# Patient Record
Sex: MALE | Race: WHITE | NOT HISPANIC OR LATINO | Employment: FULL TIME | URBAN - METROPOLITAN AREA
[De-identification: names, ages, dates, MRNs, and addresses within clinical notes are randomized per-mention and may not be internally consistent; named-entity substitution may affect disease eponyms.]

---

## 2023-05-09 ENCOUNTER — OFFICE VISIT (OUTPATIENT)
Dept: PRIMARY CARE | Facility: CLINIC | Age: 43
End: 2023-05-09
Payer: COMMERCIAL

## 2023-05-09 VITALS
TEMPERATURE: 98 F | BODY MASS INDEX: 24.77 KG/M2 | HEART RATE: 65 BPM | HEIGHT: 74 IN | RESPIRATION RATE: 16 BRPM | WEIGHT: 193 LBS | DIASTOLIC BLOOD PRESSURE: 76 MMHG | SYSTOLIC BLOOD PRESSURE: 122 MMHG | OXYGEN SATURATION: 99 %

## 2023-05-09 DIAGNOSIS — Z13.89 ENCOUNTER FOR SURVEILLANCE OF ABNORMAL NEVI: Primary | ICD-10-CM

## 2023-05-09 PROCEDURE — 1036F TOBACCO NON-USER: CPT | Performed by: FAMILY MEDICINE

## 2023-05-09 PROCEDURE — 99213 OFFICE O/P EST LOW 20 MIN: CPT | Performed by: FAMILY MEDICINE

## 2023-05-09 PROCEDURE — 17110 DESTRUCTION B9 LES UP TO 14: CPT | Performed by: FAMILY MEDICINE

## 2023-05-09 RX ORDER — EPINEPHRINE 0.3 MG/.3ML
0.3 INJECTION SUBCUTANEOUS AS NEEDED
COMMUNITY
Start: 2022-08-08

## 2023-05-09 ASSESSMENT — ENCOUNTER SYMPTOMS
MUSCULOSKELETAL NEGATIVE: 1
CONSTITUTIONAL NEGATIVE: 1
CARDIOVASCULAR NEGATIVE: 1
RESPIRATORY NEGATIVE: 1

## 2023-05-09 NOTE — PROGRESS NOTES
"Subjective   Patient ID: Yaakov Lance is a 43 y.o. male who presents for Follow-up (Mole on back).    HPI     Review of Systems   Constitutional: Negative.    Respiratory: Negative.     Cardiovascular: Negative.    Musculoskeletal: Negative.        Objective   /76 (BP Location: Right arm, Patient Position: Sitting, BP Cuff Size: Adult)   Pulse 65   Temp 36.7 °C (98 °F)   Resp 16   Ht 1.88 m (6' 2\")   Wt 87.5 kg (193 lb)   SpO2 99%   BMI 24.78 kg/m²     Physical Exam  Constitutional:       Appearance: Normal appearance.   Cardiovascular:      Rate and Rhythm: Normal rate and regular rhythm.   Pulmonary:      Effort: Pulmonary effort is normal.      Breath sounds: Normal breath sounds.   Skin:     Comments: Patient has a roughly three-quarter inch by three-quarter inch flesh-colored raised macule.   Neurological:      Mental Status: He is alert.         Assessment/Plan   Problem List Items Addressed This Visit    None  Visit Diagnoses       Encounter for surveillance of abnormal nevi    -  Primary    Relevant Orders    Referral to Dermatology    Cryotherapy, skin lesion        Cryo diarrhea x3.  Tolerated well with no complications.  He is aware of the risk complications from the procedure.  Also options of doing nothing, sending to Derm, doing a procedure here, he elected to have this done now.  We will see if this helps destroy the lesion if not he is can need a biopsy he is aware of this.  We will send him to dermatology  If any redness swelling any itching any worsening symptoms notify the office to go to the ER  Follow-up in 2 to 4 weeks       "

## 2023-07-09 LAB — URINE CULTURE: NORMAL

## 2023-07-10 LAB — URINE CULTURE: NO GROWTH

## 2023-09-19 PROBLEM — L50.3 DERMATOGRAPHIC URTICARIA: Status: ACTIVE | Noted: 2023-08-03

## 2025-03-07 ENCOUNTER — OFFICE VISIT (OUTPATIENT)
Dept: PRIMARY CARE | Facility: CLINIC | Age: 45
End: 2025-03-07
Payer: COMMERCIAL

## 2025-03-07 VITALS
BODY MASS INDEX: 24.77 KG/M2 | TEMPERATURE: 97.9 F | HEART RATE: 68 BPM | DIASTOLIC BLOOD PRESSURE: 80 MMHG | HEIGHT: 74 IN | RESPIRATION RATE: 16 BRPM | WEIGHT: 193 LBS | SYSTOLIC BLOOD PRESSURE: 128 MMHG

## 2025-03-07 DIAGNOSIS — Z71.89 CARDIAC RISK COUNSELING: ICD-10-CM

## 2025-03-07 DIAGNOSIS — Z00.00 ANNUAL PHYSICAL EXAM: Primary | ICD-10-CM

## 2025-03-07 DIAGNOSIS — Z91.030 BEE STING ALLERGY: ICD-10-CM

## 2025-03-07 DIAGNOSIS — M48.061 SPINAL STENOSIS OF LUMBAR REGION WITHOUT NEUROGENIC CLAUDICATION: ICD-10-CM

## 2025-03-07 DIAGNOSIS — Z71.85 VACCINE COUNSELING: ICD-10-CM

## 2025-03-07 PROCEDURE — 3008F BODY MASS INDEX DOCD: CPT

## 2025-03-07 PROCEDURE — 99396 PREV VISIT EST AGE 40-64: CPT

## 2025-03-07 PROCEDURE — 1036F TOBACCO NON-USER: CPT

## 2025-03-07 RX ORDER — EPINEPHRINE 0.3 MG/.3ML
1 INJECTION INTRAMUSCULAR AS NEEDED
Qty: 2 EACH | Refills: 3 | Status: SHIPPED | OUTPATIENT
Start: 2025-03-07

## 2025-03-07 NOTE — ASSESSMENT & PLAN NOTE
-Is questionable history of bee sting allergy.  He states the last time he was stung he had significant swelling at the site.  He does not seem to have ever had any anaphylactic symptoms.  Nonetheless, he has historically kept EpiPens out of abundance of caution  -Repeat EpiPens as ordered given he will be going on a long hike  Orders:    EPINEPHrine (EpiPen 2-Rashi) 0.3 mg/0.3 mL injection syringe; Inject 0.3 mL (0.3 mg) into the muscle if needed for anaphylaxis. Inject into upper leg. Call 911 after use.  May repeat dose x1 after 5-15 min

## 2025-03-07 NOTE — ASSESSMENT & PLAN NOTE
-He has a notable history of spinal stenosis which was previously worked up by my colleagues in our clinic in 2022.  He subsequently had an MRI which did show some diffuse degenerative disease of the lumbar spine as well as disc protrusion at the L5-S1 and L4-L5 regions.  He does not have a significant inciting injury and it is slightly unusual for him to have this degree of degenerative disc disease given his age.  He does not appear to have a significant family history of OA.  Given his significant service in the  including deployments in [place], it is possible that his degenerative disc disease could be at least in part or entirely due to extensive  service.  We discussed this at length  -He is otherwise stable and his symptoms have not worsened since that initial workup.  He does endorse that he occasionally gets a tingling sensation in the genital region which may be due to the compression of the S1 root

## 2025-03-07 NOTE — PROGRESS NOTES
Subjective   Yaakov Lance is a 44 y.o. male who presents for Annual Exam.  Here for annual Px. 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  He will be going on a hiking trip about 40 miles in [place].    Intermittent  blood with wiping, but only with wiping.  He states he has some intermittent anal pruritus as well.  He states he has never had any BRBPR.  He only wanted to discuss this because he wanted to assess whether or not I thought he had any type of issues with hemorrhoids.    States that when he has back pain he sometimes gets some radiating tingling/numbness sensation in his genital region.  He is wondering if this is due to his herniated disc compressing his nerve roots.          Comp Hx reviewed and documented appropriately in chart.     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 No pertinent past medical history.  Past Surgical History:   Procedure Laterality Date    OTHER SURGICAL HISTORY  10/18/2022    Vasectomy    US ASPIRATION INJECTION INTERMEDIATE JOINT  2/1/2022    US ASPIRATION INJECTION INTERMEDIATE JOINT 2/1/2022 [facility]       Social History  Social History     Social History Narrative    Not on file       Allergies and Medications  Bee venom protein (honey bee)  Current Outpatient Medications on File Prior to Visit   Medication Sig Dispense Refill    EPINEPHrine 0.3 mg/0.3 mL injection syringe Inject 0.3 mL (0.3 mg) as directed if needed for anaphylaxis. As Directed       No current facility-administered medications on file prior to visit.         /80   Pulse 68   Temp  "36.6 °C (97.9 °F)   Resp 16   Ht 1.88 m (6' 2\")   Wt 87.5 kg (193 lb)   BMI 24.78 kg/m²   Objective   Physical Exam  Constitutional:       General: He is not in acute distress.     Appearance: Normal appearance. He is not ill-appearing.   HENT:      Head: Normocephalic and atraumatic.      Right Ear: Tympanic membrane and ear canal normal. There is no impacted cerumen.      Left Ear: Tympanic membrane and ear canal normal. There is no impacted cerumen.      Mouth/Throat:      Mouth: Mucous membranes are moist.      Pharynx: Oropharynx is clear.   Eyes:      Extraocular Movements: Extraocular movements intact.      Pupils: Pupils are equal, round, and reactive to light.   Cardiovascular:      Rate and Rhythm: Normal rate and regular rhythm.      Pulses:           Radial pulses are 2+ on the right side and 2+ on the left side.        Posterior tibial pulses are 2+ on the right side and 2+ on the left side.      Heart sounds: Normal heart sounds.   Pulmonary:      Effort: Pulmonary effort is normal. No respiratory distress.      Breath sounds: Normal breath sounds.   Musculoskeletal:         General: Normal range of motion.      Cervical back: Normal range of motion and neck supple.      Right lower leg: No edema.      Left lower leg: No edema.   Lymphadenopathy:      Cervical: No cervical adenopathy.   Skin:     General: Skin is warm and dry.   Neurological:      General: No focal deficit present.      Mental Status: He is alert. Mental status is at baseline.   Psychiatric:         Mood and Affect: Mood normal.         Thought Content: Thought content normal.         Assessment & Plan  Annual physical exam  -Normal physical exam as above  -Comprehensive review of history documented in chart.  Notable history for MI in his father and paternal grandfather  -BP on manual recheck 128/80, acceptable       Vaccine counseling  -Discussed updating his Tdap vaccine.  He believes he has had it updated for work purposes with " [employer] and declined to have it repeated today       Bee sting allergy  -Is questionable history of bee sting allergy.  He states the last time he was stung he had significant swelling at the site.  He does not seem to have ever had any anaphylactic symptoms.  Nonetheless, he has historically kept EpiPens out of abundance of caution  -Repeat EpiPens as ordered given he will be going on a long hike  Orders:    EPINEPHrine (EpiPen 2-Rashi) 0.3 mg/0.3 mL injection syringe; Inject 0.3 mL (0.3 mg) into the muscle if needed for anaphylaxis. Inject into upper leg. Call 911 after use.  May repeat dose x1 after 5-15 min    Cardiac risk counseling  -Notable family history of MI.  His ASCVD score is only 1.4%.  He is age 44 and his a good candidate for CT calcium score test.  He is agreeable.  Ordered.  Orders:    CT cardiac scoring wo IV contrast; Future    Spinal stenosis of lumbar region without neurogenic claudication  -He has a notable history of spinal stenosis which was previously worked up by my colleagues in our clinic in 2022.  He subsequently had an MRI which did show some diffuse degenerative disease of the lumbar spine as well as disc protrusion at the L5-S1 and L4-L5 regions.  He does not have a significant inciting injury and it is slightly unusual for him to have this degree of degenerative disc disease given his age.  He does not appear to have a significant family history of OA.  Given his significant service in the  including deployments in [place], it is possible that his degenerative disc disease could be at least in part or entirely due to extensive  service.  We discussed this at length  -He is otherwise stable and his symptoms have not worsened since that initial workup.  He does endorse that he occasionally gets a tingling sensation in the genital region which may be due to the compression of the S1 root       Follow-up 1 year or sooner as needed.    Homero Bhandari D.O.   Family  Medicine PGY-2, [facility]  03/07/25      Please excuse any errors in grammar or translation related to this dictation. Voice recognition software may have been utilized to prepare this document.

## 2025-04-14 ENCOUNTER — APPOINTMENT (OUTPATIENT)
Dept: PRIMARY CARE | Facility: CLINIC | Age: 45
End: 2025-04-14
Payer: COMMERCIAL

## 2025-04-29 ENCOUNTER — HOSPITAL ENCOUNTER (OUTPATIENT)
Dept: RADIOLOGY | Facility: HOSPITAL | Age: 45
Discharge: HOME | End: 2025-04-29
Payer: COMMERCIAL

## 2025-04-29 DIAGNOSIS — Z71.89 CARDIAC RISK COUNSELING: ICD-10-CM

## 2025-04-29 PROCEDURE — 75571 CT HRT W/O DYE W/CA TEST: CPT

## 2025-06-16 ENCOUNTER — TELEPHONE (OUTPATIENT)
Dept: PRIMARY CARE | Facility: CLINIC | Age: 45
End: 2025-06-16
Payer: COMMERCIAL

## 2025-06-16 DIAGNOSIS — Z91.030 BEE STING ALLERGY: ICD-10-CM

## 2025-06-16 RX ORDER — EPINEPHRINE 0.3 MG/.3ML
1 INJECTION INTRAMUSCULAR AS NEEDED
Qty: 2 EACH | Refills: 3 | Status: SHIPPED | OUTPATIENT
Start: 2025-06-16

## 2025-06-16 RX ORDER — EPINEPHRINE 0.3 MG/.3ML
1 INJECTION INTRAMUSCULAR AS NEEDED
Qty: 2 EACH | Refills: 3 | Status: SHIPPED | OUTPATIENT
Start: 2025-06-16 | End: 2025-06-16 | Stop reason: SDUPTHER

## 2025-06-16 NOTE — TELEPHONE ENCOUNTER
Rx Refill Request Telephone Encounter    Name:  Yaakov Lance  :  [id number]  Medication Name:    EPINEPHrine (EpiPen 2-Rashi) 0.3 mg/0.3 mL injection syringe        Sig: Inject 0.3 mL (0.3 mg) into the muscle if needed for anaphylaxis. Inject into upper leg. Call 911 after use.          Specific Pharmacy location:    [facility]/pharmacy #[id number] - [facility] [id number] [place] [address] [facility] [id number]

## 2025-06-30 ENCOUNTER — OFFICE VISIT (OUTPATIENT)
Dept: PRIMARY CARE | Facility: CLINIC | Age: 45
End: 2025-06-30
Payer: COMMERCIAL

## 2025-06-30 ENCOUNTER — HOSPITAL ENCOUNTER (OUTPATIENT)
Dept: RADIOLOGY | Facility: CLINIC | Age: 45
Discharge: HOME | End: 2025-06-30
Payer: COMMERCIAL

## 2025-06-30 VITALS
OXYGEN SATURATION: 98 % | SYSTOLIC BLOOD PRESSURE: 130 MMHG | DIASTOLIC BLOOD PRESSURE: 82 MMHG | TEMPERATURE: 98.9 F | WEIGHT: 187 LBS | HEART RATE: 68 BPM | BODY MASS INDEX: 24.01 KG/M2 | RESPIRATION RATE: 14 BRPM

## 2025-06-30 DIAGNOSIS — M79.675 PAIN OF LEFT GREAT TOE: Primary | ICD-10-CM

## 2025-06-30 DIAGNOSIS — M79.675 PAIN OF LEFT GREAT TOE: ICD-10-CM

## 2025-06-30 PROCEDURE — 1036F TOBACCO NON-USER: CPT | Performed by: FAMILY MEDICINE

## 2025-06-30 PROCEDURE — 99214 OFFICE O/P EST MOD 30 MIN: CPT | Performed by: FAMILY MEDICINE

## 2025-06-30 PROCEDURE — 73630 X-RAY EXAM OF FOOT: CPT | Mod: LEFT SIDE | Performed by: STUDENT IN AN ORGANIZED HEALTH CARE EDUCATION/TRAINING PROGRAM

## 2025-06-30 PROCEDURE — 73630 X-RAY EXAM OF FOOT: CPT | Mod: LT

## 2025-06-30 RX ORDER — INDOMETHACIN 50 MG/1
50 CAPSULE ORAL
Qty: 20 CAPSULE | Refills: 0 | Status: SHIPPED | OUTPATIENT
Start: 2025-06-30 | End: 2025-07-10

## 2025-06-30 ASSESSMENT — ENCOUNTER SYMPTOMS
VOMITING: 0
FATIGUE: 0
CONSTIPATION: 0
SHORTNESS OF BREATH: 0
WEAKNESS: 0
NUMBNESS: 0
DIARRHEA: 0
NAUSEA: 0
WHEEZING: 0
COUGH: 0
DIFFICULTY URINATING: 0
ARTHRALGIAS: 1

## 2025-06-30 NOTE — PROGRESS NOTES
Subjective   Patient ID: Yaakov Lance is a 45 y.o. male who presents for Toe Pain.    PT reports hiking last week; no specific injury, but toe pain shortly after. Worsening since Saturday.    Toe Pain   Incident onset: Wednesday 6/25/25. There was no injury mechanism. Pain location: Left great toe. The quality of the pain is described as aching. Pertinent negatives include no numbness.        Review of Systems   Constitutional:  Negative for fatigue.        Duration of symptoms 5 d  Pt had been hiking, now with left great toe red and swollen  Foot was sliding in boot going down hill. Pt denies trauma. No ingrown toenail  No hx of gout, brother with gout   Respiratory:  Negative for cough, shortness of breath and wheezing.    Cardiovascular:  Negative for chest pain.   Gastrointestinal:  Negative for constipation, diarrhea, nausea and vomiting.   Genitourinary:  Negative for difficulty urinating.   Musculoskeletal:  Positive for arthralgias.        Left great toe redness and welling   Neurological:  Negative for weakness and numbness.       Objective   /82   Pulse 68   Temp 37.2 °C (98.9 °F)   Resp 14   Wt 84.8 kg (187 lb)   SpO2 98%   BMI 24.01 kg/m²     Physical Exam  Vitals and nursing note reviewed.   Constitutional:       General: He is not in acute distress.     Appearance: Normal appearance.   HENT:      Head: Normocephalic and atraumatic.   Cardiovascular:      Rate and Rhythm: Normal rate and regular rhythm.      Heart sounds: Normal heart sounds.   Pulmonary:      Effort: Pulmonary effort is normal.      Breath sounds: Normal breath sounds.   Musculoskeletal:         General: Tenderness present. No swelling or deformity.      Comments: Pt left first toe, MTP jt with tenderness to palpation  No erythema or edema, mild decrease ROM due to pain  Neurovasc intact   Skin:     General: Skin is warm and dry.      Findings: No bruising, erythema or rash.   Neurological:      General: No focal deficit  present.      Mental Status: He is alert.         Assessment/Plan   Problem List Items Addressed This Visit           ICD-10-CM    Pain of left great toe - Primary M79.675    Advised pt to take med as directed  Get uric acid level and xr today  Rest, elevate foot  F/up if no improvement         Relevant Medications    indomethacin (Indocin) 50 mg capsule    Other Relevant Orders    Uric acid    XR foot left 3+ views

## 2025-06-30 NOTE — ASSESSMENT & PLAN NOTE
Advised pt to take med as directed  Get uric acid level and xr today  Rest, elevate foot  F/up if no improvement

## 2025-07-01 LAB — URATE SERPL-MCNC: 5.3 MG/DL (ref 4–8)

## 2025-07-02 ENCOUNTER — TELEPHONE (OUTPATIENT)
Dept: PRIMARY CARE | Facility: CLINIC | Age: 45
End: 2025-07-02
Payer: COMMERCIAL

## 2025-07-02 NOTE — TELEPHONE ENCOUNTER
----- Message from Elissa SALES sent at 7/2/2025  8:56 AM EDT -----    ----- Message -----  From: Ida Mello MD  Sent: 7/1/2025  12:56 PM EDT  To: Elissa Brewer CMA    Call pt, foot xr just showed mild soft tissue swelling  ----- Message -----  From: Interface, Radiology Results In  Sent: 7/1/2025   9:04 AM EDT  To: Ida Mello MD